# Patient Record
Sex: MALE | Race: WHITE
[De-identification: names, ages, dates, MRNs, and addresses within clinical notes are randomized per-mention and may not be internally consistent; named-entity substitution may affect disease eponyms.]

---

## 2018-09-23 ENCOUNTER — HOSPITAL ENCOUNTER (EMERGENCY)
Dept: HOSPITAL 14 - H.ER | Age: 28
Discharge: HOME | End: 2018-09-23
Payer: COMMERCIAL

## 2018-09-23 VITALS
TEMPERATURE: 98.3 F | DIASTOLIC BLOOD PRESSURE: 68 MMHG | SYSTOLIC BLOOD PRESSURE: 109 MMHG | OXYGEN SATURATION: 98 % | HEART RATE: 61 BPM | RESPIRATION RATE: 17 BRPM

## 2018-09-23 DIAGNOSIS — S61.011A: Primary | ICD-10-CM
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
## 2018-09-23 NOTE — ED PDOC
HPI: General Adult


Time Seen by Provider: 09/23/18 19:11


Chief Complaint (Nursing): Abnormal Skin Integrity


Chief Complaint (Provider): Abnormal Skin Integrity


History Per: Patient


History/Exam Limitations: no limitations


Onset/Duration Of Symptoms: Hrs


Current Symptoms Are (Timing): Still Present


Additional Complaint(s): 


Mani Maldonado is a 28 year old male with no past medical history who is 

presenting to the ED for evaluation of right hand injury, onset just prior to 

arrival. Patient states that he accidentally cut himself with broken glass from 

a mirror and sustained a wound to right thumb. He is unable to recall last 

tetanus. Patient offers no other medical complaints at this time. Denies FB 

sensation.





Of note, patient is left hand dominant. 


PMD: none provided





Past Medical History


Reviewed: Historical Data, Nursing Documentation, Vital Signs


Vital Signs: 


 Last Vital Signs











Temp  98.3 F   09/23/18 19:09


 


Pulse  61   09/23/18 19:09


 


Resp  17   09/23/18 19:09


 


BP  109/68   09/23/18 19:09


 


Pulse Ox  98   09/23/18 19:48














- Medical History


PMH: No Chronic Diseases





- Surgical History


Surgical History: No Surg Hx





- Family History


Family History: States: Unknown Family Hx





- Social History


Current smoker - smoking cessation education provided: No


Alcohol: None


Drugs: Denies





- Allergies


Allergies/Adverse Reactions: 


 Allergies











Allergy/AdvReac Type Severity Reaction Status Date / Time


 


No Known Allergies Allergy   Verified 09/23/18 19:09














Review of Systems


ROS Statement: Except As Marked, All Systems Reviewed And Found Negative


Musculoskeletal: Positive for: Hand Pain (thumb wound)





Physical Exam





- Reviewed


Nursing Documentation Reviewed: Yes


Vital Signs Reviewed: Yes





- Physical Exam


Comments: 


GENERALIZED APPEARANCE: Patient is awake, alert, oriented x3 in no acute 

distress. Resting comfortably.


SKIN: Warm, dry; (-) cyanosis.


NECK: Supple, FROM


ENT: Mucus membranes moist. Airway patent, (-)stridor. 


CHEST AND RESPIRATORY: (-) rales, (-) rhonchi, (-) wheezes; breath sounds equal 

bilaterally. Respirations even and nonlabored. 


HEART AND CARDIOVASCULAR: (-) irregularity


Right hand: (+) .75cm superficial, C-shaped laceration to dorsum of first PIP, (

-) active bleeding (+) full ROM of all digits; sensation and cap refill intact. 

Remainder of hand, digits, and wrist: nontender with FROM. (+) pulses


NEURO AND PSYCH: Mental status as above. Gait: steady. Speech: clear. (-) 

facial asymmetry (-) aphasia





- ECG


O2 Sat by Pulse Oximetry: 98 (RA)


Pulse Ox Interpretation: Normal





Medical Decision Making


Medical Decision Making: 


Time: 19:20


Impression: Thumb Laceration


Plan:


--Adacel 0.5 ml IM


--Dermabond


--Re-evaluation





1930


Sam BROCK applied Dermabond to laceration site. Patient tolerated procedure 

well. Educated on adhesive wound care.





1945


On re-evaluation, patient reports improvement of symptoms. On exam, patient 

remains AAOx3, in no acute distress. Lungs clear to auscultation, cardiac RRR, 

repeat neuro exam shows no focal findings.  Vitals stable.


Lab/Diagnostic results d/w the patient in great detail. Diagnosis of finger 

laceration d/w the patient. 


Based on history, exam and diagnostic results, plan will be for outpatient 

follow up. 


Patient instructed to follow-up with pmd / referral provided / the clinic  in 1-

2 days without fail. Return to the emergency room at any time for any new or 

worsening symptoms. Patient states he fully agrees with and understands 

discharge instructions. States that he agrees with the plan and disposition. 

Verbalized and repeated discharge instructions and plan. I have given the 

patient opportunity to ask any additional questions.


--------------------------------------------------------------------------------

-----------------


Scribe Attestation:


Documented by Yesica Mercado, acting as a scribe for America Vargas PA-C.





Provider Scribe Attestation:


All medical record entries made by the Scribe were at my direction and 

personally dictated by me. I have reviewed the chart and agree that the record 

accurately reflects my personal performance of the history, physical exam, 

medical decision making, and the department course for this patient. I have 

also personally directed, reviewed, and agree with the discharge instructions 

and disposition.














Procedures





- Laceration/Wound Repair


  ** Right Dorsal Hand


Wound Length (cm): 0.75


Wound's Depth, Shape: superficial (C shaped)


Wound Explored: clean


Betadine Prep?: No


Wound Debrided: minimal


Wound Repaired With: Skin adhesive (Dermabond)


Wound Complexity: Simple





Disposition





- Clinical Impression


Clinical Impression: 


 Finger laceration








- Patient ED Disposition


Is Patient to be Admitted: No


Counseled Patient/Family Regarding: Studies Performed, Diagnosis, Need For 

Followup





- Disposition


Referrals: 


formerly [facility] [Outside]


Disposition: Routine/Home


Disposition Time: 19:45


Condition: STABLE


Additional Instructions: 


The emergency medical care you received today was directed at your acute 

symptoms. If you were prescribed any medication, please fill it and take as 

directed. It may take several days for your symptoms to resolve. Return to the 

Emergency Department if your symptoms worsen, do not improve, or if you have 

any other problems.





Please contact your doctor in 2 days for re-evaluation and follow up / or call 

one of the physicians/clinics you have been referred to that are listed on the 

Patient Visit Information form that is included in your discharge packet. Bring 

any paperwork you were given at discharge with you along with any medications 

you are taking to your follow up visit. Our treatment cannot replace ongoing 

medical care by a primary care provider (PCP) outside of the emergency 

department.


Instructions:  Laceration Repair With Glue (DC), Common Finger Injuries


Forms:  [employer] (English)


Print Language: ENGLISH





- POA


Present On Arrival: None